# Patient Record
Sex: FEMALE | Race: WHITE
[De-identification: names, ages, dates, MRNs, and addresses within clinical notes are randomized per-mention and may not be internally consistent; named-entity substitution may affect disease eponyms.]

---

## 2022-08-09 ENCOUNTER — HOSPITAL ENCOUNTER (OUTPATIENT)
Dept: HOSPITAL 75 - ORTHO | Age: 82
End: 2022-08-09
Attending: ORTHOPAEDIC SURGERY
Payer: MEDICARE

## 2022-08-09 DIAGNOSIS — M25.552: Primary | ICD-10-CM

## 2022-08-09 PROCEDURE — 20610 DRAIN/INJ JOINT/BURSA W/O US: CPT

## 2022-08-09 PROCEDURE — 73502 X-RAY EXAM HIP UNI 2-3 VIEWS: CPT

## 2022-08-09 NOTE — DIAGNOSTIC IMAGING REPORT
INDICATION: 

Left hip pain.



TIME OF EXAM: 

2:59 PM.



FINDINGS:

Two views of the left hip demonstrate normal femoroacetabular

alignment. The femoral head and neck appear to be intact. No

fractures are seen. The left-sided rami are intact.



IMPRESSION: 

No acute abnormality is identified.



Dictated by: 



  Dictated on workstation # [id number]

## 2022-10-11 ENCOUNTER — HOSPITAL ENCOUNTER (OUTPATIENT)
Dept: HOSPITAL 75 - ORTHO | Age: 82
End: 2022-10-11
Attending: ORTHOPAEDIC SURGERY
Payer: MEDICARE

## 2022-10-11 DIAGNOSIS — E11.9: ICD-10-CM

## 2022-10-11 DIAGNOSIS — I10: ICD-10-CM

## 2022-10-11 DIAGNOSIS — M54.32: ICD-10-CM

## 2022-10-11 DIAGNOSIS — M70.62: Primary | ICD-10-CM

## 2022-10-11 PROCEDURE — 99213 OFFICE O/P EST LOW 20 MIN: CPT
